# Patient Record
Sex: MALE | Race: BLACK OR AFRICAN AMERICAN | NOT HISPANIC OR LATINO | URBAN - METROPOLITAN AREA
[De-identification: names, ages, dates, MRNs, and addresses within clinical notes are randomized per-mention and may not be internally consistent; named-entity substitution may affect disease eponyms.]

---

## 2017-07-31 ENCOUNTER — COMMUNICATION - HEALTHEAST (OUTPATIENT)
Dept: ADMINISTRATIVE | Facility: CLINIC | Age: 52
End: 2017-07-31

## 2017-08-02 ENCOUNTER — OFFICE VISIT - HEALTHEAST (OUTPATIENT)
Dept: FAMILY MEDICINE | Facility: CLINIC | Age: 52
End: 2017-08-02

## 2017-08-02 DIAGNOSIS — Z28.39 IMMUNIZATION DEFICIENCY: ICD-10-CM

## 2017-08-02 DIAGNOSIS — Z86.73 HISTORY OF STROKE: ICD-10-CM

## 2017-08-02 DIAGNOSIS — G81.91 RIGHT HEMIPLEGIA (H): ICD-10-CM

## 2017-08-02 RX ORDER — BACLOFEN 10 MG/1
10 TABLET ORAL
Status: SHIPPED | COMMUNITY
Start: 2017-05-10

## 2017-08-02 RX ORDER — ACETAMINOPHEN 325 MG/1
650 TABLET ORAL
Status: SHIPPED | COMMUNITY
Start: 2017-08-02

## 2017-08-02 RX ORDER — ATORVASTATIN CALCIUM 40 MG/1
40 TABLET, FILM COATED ORAL
Status: SHIPPED | COMMUNITY
Start: 2017-05-10
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
## 2021-06-16 PROBLEM — G81.91 RIGHT HEMIPLEGIA (H): Status: ACTIVE | Noted: 2017-08-02

## 2021-06-16 PROBLEM — Z86.73 HISTORY OF STROKE: Status: ACTIVE | Noted: 2017-08-02
